# Patient Record
Sex: FEMALE | Race: WHITE | NOT HISPANIC OR LATINO
[De-identification: names, ages, dates, MRNs, and addresses within clinical notes are randomized per-mention and may not be internally consistent; named-entity substitution may affect disease eponyms.]

---

## 2022-06-19 ENCOUNTER — NON-APPOINTMENT (OUTPATIENT)
Age: 77
End: 2022-06-19

## 2022-11-29 PROBLEM — Z00.00 ENCOUNTER FOR PREVENTIVE HEALTH EXAMINATION: Status: ACTIVE | Noted: 2022-11-29

## 2022-12-10 ENCOUNTER — OFFICE (OUTPATIENT)
Dept: URBAN - METROPOLITAN AREA CLINIC 38 | Facility: CLINIC | Age: 77
Setting detail: OPHTHALMOLOGY
End: 2022-12-10
Payer: MEDICARE

## 2022-12-10 DIAGNOSIS — H01.001: ICD-10-CM

## 2022-12-10 DIAGNOSIS — H11.153: ICD-10-CM

## 2022-12-10 DIAGNOSIS — H01.005: ICD-10-CM

## 2022-12-10 DIAGNOSIS — H01.004: ICD-10-CM

## 2022-12-10 DIAGNOSIS — H16.223: ICD-10-CM

## 2022-12-10 DIAGNOSIS — H40.013: ICD-10-CM

## 2022-12-10 DIAGNOSIS — H02.834: ICD-10-CM

## 2022-12-10 DIAGNOSIS — H43.813: ICD-10-CM

## 2022-12-10 DIAGNOSIS — H02.831: ICD-10-CM

## 2022-12-10 DIAGNOSIS — H35.033: ICD-10-CM

## 2022-12-10 DIAGNOSIS — E11.9: ICD-10-CM

## 2022-12-10 DIAGNOSIS — H01.002: ICD-10-CM

## 2022-12-10 PROCEDURE — 92014 COMPRE OPH EXAM EST PT 1/>: CPT | Performed by: OPHTHALMOLOGY

## 2022-12-10 ASSESSMENT — REFRACTION_MANIFEST
OU_VA: 20/20
OS_SPHERE: PLANO
OS_AXIS: 000
OD_VA1: 20/20
OS_AXIS: 150
OD_SPHERE: PLANO
OU_VA: 20/20
OS_VA1: 20/20
OS_ADD: +2.25
OD_SPHERE: PLANO
OD_VA2: 20/20(J1+)
OS_ADD: +2.75
OD_VA1: 20/20
OD_ADD: +2.25
OD_ADD: +2.75
OS_VA2: 20/20(J1+)
OS_CYLINDER: SPH
OS_SPHERE: -0.25
OS_CYLINDER: +0.50
OD_CYLINDER: +0.25
OS_VA2: 20/20(J1+)
OD_VA2: 20/20(J1+)
OS_VA1: 20/20
OD_AXIS: 010

## 2022-12-10 ASSESSMENT — REFRACTION_CURRENTRX
OD_SPHERE: +2.50
OS_OVR_VA: 20/
OS_SPHERE: +2.50
OD_OVR_VA: 20/
OD_VPRISM_DIRECTION: SV
OS_VPRISM_DIRECTION: SV

## 2022-12-10 ASSESSMENT — AXIALLENGTH_DERIVED: OD_AL: 23.8706

## 2022-12-10 ASSESSMENT — KERATOMETRY
OD_AXISANGLE_DEGREES: 093
OS_K1POWER_DIOPTERS: 42.50
OD_K1POWER_DIOPTERS: 42.50
METHOD_AUTO_MANUAL: AUTO
OS_AXISANGLE_DEGREES: 086
OD_K2POWER_DIOPTERS: 43.00
OS_K2POWER_DIOPTERS: 43.00

## 2022-12-10 ASSESSMENT — VISUAL ACUITY
OS_BCVA: 20/20-
OD_BCVA: 20/20-2

## 2022-12-10 ASSESSMENT — LID POSITION - DERMATOCHALASIS
OD_DERMATOCHALASIS: RUL 1+
OS_DERMATOCHALASIS: LUL 1+

## 2022-12-10 ASSESSMENT — LID POSITION - COMMENTS
OS_COMMENTS: BROW PTOSIS
OD_COMMENTS: BROW PTOSIS

## 2022-12-10 ASSESSMENT — REFRACTION_AUTOREFRACTION
OD_CYLINDER: +0.50
OS_AXIS: 153
OD_AXIS: 022
OD_SPHERE: -0.25
OS_CYLINDER: +0.50
OS_SPHERE: PLANO

## 2022-12-10 ASSESSMENT — LID EXAM ASSESSMENTS
OS_COMMENTS: DEMODEX
OS_BLEPHARITIS: LLL LUL 2+
OD_COMMENTS: DEMODEX
OD_BLEPHARITIS: RLL RUL 2+

## 2022-12-10 ASSESSMENT — TONOMETRY
OS_IOP_MMHG: 16
OD_IOP_MMHG: 15

## 2022-12-10 ASSESSMENT — SPHEQUIV_DERIVED: OD_SPHEQUIV: 0

## 2022-12-10 ASSESSMENT — CONFRONTATIONAL VISUAL FIELD TEST (CVF)
OD_FINDINGS: FULL
OS_FINDINGS: FULL

## 2023-05-03 ENCOUNTER — APPOINTMENT (OUTPATIENT)
Dept: ENDOCRINOLOGY | Facility: CLINIC | Age: 78
End: 2023-05-03
Payer: MEDICARE

## 2023-05-03 VITALS
WEIGHT: 166 LBS | SYSTOLIC BLOOD PRESSURE: 116 MMHG | DIASTOLIC BLOOD PRESSURE: 68 MMHG | BODY MASS INDEX: 30.55 KG/M2 | HEIGHT: 62 IN | HEART RATE: 82 BPM | TEMPERATURE: 97.8 F | OXYGEN SATURATION: 97 % | RESPIRATION RATE: 14 BRPM

## 2023-05-03 PROCEDURE — 99214 OFFICE O/P EST MOD 30 MIN: CPT

## 2023-05-03 NOTE — PHYSICAL EXAM
[Alert] : alert [Normal Sclera/Conjunctiva] : normal sclera/conjunctiva [PERRL] : pupils equal, round and reactive to light [Normal Outer Ear/Nose] : the ears and nose were normal in appearance [Normal Hearing] : hearing was normal [No Respiratory Distress] : no respiratory distress [Clear to Auscultation] : lungs were clear to auscultation bilaterally [Normal S1, S2] : normal S1 and S2 [Normal Rate] : heart rate was normal [No Murmurs] : no murmurs [Regular Rhythm] : with a regular rhythm [Carotids Normal] : carotid pulses were normal with no bruits [No Edema] : no peripheral edema [No Varicosities] : there were no varicosital changes [Not Tender] : non-tender [Soft] : abdomen soft [No HSM] : no hepato-splenomegaly [Normal Supraclavicular Nodes] : no supraclavicular lymphadenopathy [Normal Anterior Cervical Nodes] : no anterior cervical lymphadenopathy [Normal Posterior Cervical Nodes] : no posterior cervical lymphadenopathy [No CVA Tenderness] : no ~M costovertebral angle tenderness [No Spinal Tenderness] : no spinal tenderness [Normal Gait] : normal gait [Kyphosis] : no kyphosis present [No Clubbing, Cyanosis] : no clubbing  or cyanosis of the fingernails [No Joint Swelling] : no joint swelling seen [Normal Strength/Tone] : muscle strength and tone were normal [No Rash] : no rash [Cranial Nerves Intact] : cranial nerves 2-12 were intact [Normal Reflexes] : deep tendon reflexes were 2+ and symmetric [Oriented x3] : oriented to person, place, and time [de-identified] : Slightly prominent thyroid gland with small palpable thyroid nodules. [FreeTextEntry1] : Deferred [de-identified] : Deferred [de-identified] : Deferred [de-identified] : Denies any polyuria polydipsia and dysuria

## 2023-05-03 NOTE — HISTORY OF PRESENT ILLNESS
[FreeTextEntry1] : 28-year-old white female with a past medical history of type 2 diabetes hypertension, obstructive sleep apnea, multinodular goiter had fine-needle aspiration biopsy performed on the left lower pole reported to be benign.  Patient claimed that she recently was admitted with dehydration and urinary tract infection while she was in [place] and possible sepsis.  She was then taken of the Farxiga and her sugar levels have been fluctuating between 80 to 89 mg per DL in the morning.  She also reports a weight loss of 40 pounds for the past 2 years.  She occasionally experiences episodes of dizziness but denies any loss of consciousness..  There is no history of any nausea vomiting abdominal pain diarrhea or dysuria.  Her vision has been stable.  She denies chest pain shortness of breath nausea vomiting fever or chills.  Her current medications include losartan, amlodipine, I will allopurinol, Ozempic 0.25 mg subcu every week and vitamin D3 supplements.

## 2023-05-03 NOTE — ASSESSMENT
[Diabetes Foot Care] : diabetes foot care [Long Term Vascular Complications] : long term vascular complications of diabetes [Carbohydrate Consistent Diet] : carbohydrate consistent diet [Importance of Diet and Exercise] : importance of diet and exercise to improve glycemic control, achieve weight loss and improve cardiovascular health [Exercise/Effect on Glucose] : exercise/effect on glucose [Hypoglycemia Management] : hypoglycemia management [Self Monitoring of Blood Glucose] : self monitoring of blood glucose [Retinopathy Screening] : Patient was referred to ophthalmology for retinopathy screening [FreeTextEntry1] : Middle-aged white female with a past medical history of for type 2 diabetes who recently had an episode of acute dehydration with a UTI and possible sepsis.  Most likely this was secondary to the use of Farxiga as the patient was not drinking enough oral liquids.  Currently she is stable and off the medication and is tolerating Ozempic 0.25 mg tablet daily her glucose levels are stable.  Patient also has a history of a multinodular goiter for which she has had multiple biopsies in the past which were reported to be negative.  Has most recent lab test from [place] show a normal complete metabolic panel cholesterol was 153 LDL cholesterol of 68 urine albumin/creatinine 2 was normal at 7 hemoglobin A1c level was 5.5% TSH is 1.68 lipase is normal at 33.  Patient is currently clinically stable with medical trolled type 2 diabetes , her dietary intake and is slowly starting to exercise.  The findings were discussed in detail with the patient and she was counseled about the results of the blood test.  Recommendation\par 1.  I have advised the patient to continue her current therapy with Ozempic.  Patient was made aware and nausea and vomiting.\par Patient will continue with a strict diabetic diet\par 3.  I am also requesting a follow-up sonogram of the thyroid to monitor the size of the thyroid nodules.\par 4.  The importance of adequate hydration was discussed with the patient and explained to her in detail.\par 5.  The above plan was discussed in detail with the patient and she will return to the office in approximately 3 to 4 months for follow-up with a repeat blood test.  Thank you

## 2023-06-30 ENCOUNTER — OFFICE (OUTPATIENT)
Dept: URBAN - METROPOLITAN AREA CLINIC 103 | Facility: CLINIC | Age: 78
Setting detail: OPHTHALMOLOGY
End: 2023-06-30
Payer: MEDICARE

## 2023-06-30 DIAGNOSIS — H01.001: ICD-10-CM

## 2023-06-30 DIAGNOSIS — H01.002: ICD-10-CM

## 2023-06-30 DIAGNOSIS — H35.373: ICD-10-CM

## 2023-06-30 DIAGNOSIS — H40.013: ICD-10-CM

## 2023-06-30 DIAGNOSIS — H02.834: ICD-10-CM

## 2023-06-30 DIAGNOSIS — H01.004: ICD-10-CM

## 2023-06-30 DIAGNOSIS — H02.831: ICD-10-CM

## 2023-06-30 DIAGNOSIS — H35.033: ICD-10-CM

## 2023-06-30 DIAGNOSIS — H43.813: ICD-10-CM

## 2023-06-30 DIAGNOSIS — E11.9: ICD-10-CM

## 2023-06-30 DIAGNOSIS — H01.005: ICD-10-CM

## 2023-06-30 DIAGNOSIS — H11.153: ICD-10-CM

## 2023-06-30 PROCEDURE — 92201 OPSCPY EXTND RTA DRAW UNI/BI: CPT | Performed by: OPHTHALMOLOGY

## 2023-06-30 PROCEDURE — 92014 COMPRE OPH EXAM EST PT 1/>: CPT | Performed by: OPHTHALMOLOGY

## 2023-06-30 PROCEDURE — 92134 CPTRZ OPH DX IMG PST SGM RTA: CPT | Performed by: OPHTHALMOLOGY

## 2023-06-30 ASSESSMENT — REFRACTION_MANIFEST
OU_VA: 20/20
OS_VA1: 20/20
OS_AXIS: 000
OD_VA2: 20/20(J1+)
OD_VA2: 20/20(J1+)
OD_ADD: +2.25
OS_CYLINDER: SPH
OD_CYLINDER: +0.25
OD_SPHERE: PLANO
OD_SPHERE: PLANO
OS_ADD: +2.25
OU_VA: 20/20
OD_VA1: 20/20
OS_SPHERE: PLANO
OS_SPHERE: -0.25
OS_VA1: 20/20
OD_VA1: 20/20
OS_CYLINDER: +0.50
OS_ADD: +2.75
OD_ADD: +2.75
OD_AXIS: 010
OS_VA2: 20/20(J1+)
OS_AXIS: 150
OS_VA2: 20/20(J1+)

## 2023-06-30 ASSESSMENT — LID POSITION - DERMATOCHALASIS
OS_DERMATOCHALASIS: LUL 2+
OD_DERMATOCHALASIS: RUL 2+

## 2023-06-30 ASSESSMENT — LID EXAM ASSESSMENTS
OD_COMMENTS: DEMODEX
OS_BLEPHARITIS: LLL LUL 2+
OS_COMMENTS: DEMODEX
OD_BLEPHARITIS: RLL RUL 2+

## 2023-06-30 ASSESSMENT — LID POSITION - COMMENTS
OD_COMMENTS: BROW PTOSIS
OS_COMMENTS: BROW PTOSIS

## 2023-06-30 ASSESSMENT — CONFRONTATIONAL VISUAL FIELD TEST (CVF)
OD_FINDINGS: FULL
OS_FINDINGS: FULL

## 2023-06-30 ASSESSMENT — REFRACTION_AUTOREFRACTION
OD_SPHERE: +0.25
OS_AXIS: 079
OD_AXIS: 095
OS_CYLINDER: -0.50
OD_CYLINDER: -0.50
OS_SPHERE: +0.25

## 2023-06-30 ASSESSMENT — VISUAL ACUITY
OS_BCVA: 20/20-1
OD_BCVA: 20/25-1

## 2023-06-30 ASSESSMENT — REFRACTION_CURRENTRX
OD_OVR_VA: 20/
OS_SPHERE: +2.50
OD_VPRISM_DIRECTION: SV
OS_OVR_VA: 20/
OS_VPRISM_DIRECTION: SV
OD_SPHERE: +2.50

## 2023-06-30 ASSESSMENT — SPHEQUIV_DERIVED
OD_SPHEQUIV: 0
OS_SPHEQUIV: 0

## 2023-06-30 ASSESSMENT — TONOMETRY
OS_IOP_MMHG: 14
OD_IOP_MMHG: 14

## 2023-09-22 ENCOUNTER — APPOINTMENT (OUTPATIENT)
Dept: ENDOCRINOLOGY | Facility: CLINIC | Age: 78
End: 2023-09-22
Payer: MEDICARE

## 2023-09-22 VITALS
TEMPERATURE: 97.1 F | WEIGHT: 168 LBS | RESPIRATION RATE: 14 BRPM | DIASTOLIC BLOOD PRESSURE: 74 MMHG | OXYGEN SATURATION: 97 % | HEIGHT: 62 IN | HEART RATE: 92 BPM | BODY MASS INDEX: 30.91 KG/M2 | SYSTOLIC BLOOD PRESSURE: 120 MMHG

## 2023-09-22 PROCEDURE — 99213 OFFICE O/P EST LOW 20 MIN: CPT

## 2023-09-22 RX ORDER — FOLIC ACID 20 MG
CAPSULE ORAL
Refills: 0 | Status: ACTIVE | COMMUNITY

## 2023-09-22 RX ORDER — PNV NO.95/FERROUS FUM/FOLIC AC 28MG-0.8MG
TABLET ORAL
Refills: 0 | Status: ACTIVE | COMMUNITY

## 2023-09-28 RX ORDER — BLOOD SUGAR DIAGNOSTIC
STRIP MISCELLANEOUS
Qty: 1 | Refills: 3 | Status: ACTIVE | COMMUNITY
Start: 2023-09-28 | End: 1900-01-01

## 2023-12-08 LAB — HBA1C MFR BLD HPLC: 5.3

## 2023-12-22 ENCOUNTER — OFFICE (OUTPATIENT)
Dept: URBAN - METROPOLITAN AREA CLINIC 9 | Facility: CLINIC | Age: 78
Setting detail: OPHTHALMOLOGY
End: 2023-12-22
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
## 2023-12-22 PROCEDURE — NO SHOW FE NO SHOW FEE: Performed by: OPHTHALMOLOGY

## 2023-12-27 ENCOUNTER — APPOINTMENT (OUTPATIENT)
Dept: INFECTIOUS DISEASE | Facility: CLINIC | Age: 78
End: 2023-12-27
Payer: MEDICARE

## 2023-12-27 VITALS
HEART RATE: 68 BPM | BODY MASS INDEX: 30.91 KG/M2 | SYSTOLIC BLOOD PRESSURE: 126 MMHG | DIASTOLIC BLOOD PRESSURE: 78 MMHG | HEIGHT: 62 IN | WEIGHT: 168 LBS | OXYGEN SATURATION: 99 %

## 2023-12-27 DIAGNOSIS — A04.72 ENTEROCOLITIS DUE TO CLOSTRIDIUM DIFFICILE, NOT SPECIFIED AS RECURRENT: ICD-10-CM

## 2023-12-27 DIAGNOSIS — D72.829 ELEVATED WHITE BLOOD CELL COUNT, UNSPECIFIED: ICD-10-CM

## 2023-12-27 PROCEDURE — 99214 OFFICE O/P EST MOD 30 MIN: CPT

## 2023-12-27 NOTE — PHYSICAL EXAM
[General Appearance - Alert] : alert [General Appearance - In No Acute Distress] : in no acute distress [General Appearance - Well Nourished] : well nourished [Extraocular Movements] : extraocular movements were intact [Hearing Threshold Finger Rub Not [place]] : hearing was normal [Neck Appearance] : the appearance of the neck was normal [Edema] : there was no peripheral edema [Abdomen Soft] : soft [Abdomen Tenderness] : non-tender [FreeTextEntry1] : no suprapubic tenderness [Musculoskeletal - Swelling] : no joint swelling [] : no rash [Affect] : the affect was normal

## 2023-12-27 NOTE — REVIEW OF SYSTEMS
[Fever] : no fever [Chills] : no chills [Feeling Tired] : feeling tired [Recent Weight Loss (___ Lbs)] : recent [unfilled] ~Ulb weight loss [Red Eyes] : eyes not red [Loss Of Hearing] : no hearing loss [Palpitations] : no palpitations [Shortness Of Breath] : no shortness of breath [Abdominal Pain] : no abdominal pain [Diarrhea] : no diarrhea [Dysuria] : no dysuria [Joint Pain] : no joint pain [Skin Lesions] : no skin lesions [Confused] : no confusion [Easy Bleeding] : no tendency for easy bleeding

## 2023-12-27 NOTE — ASSESSMENT
[FreeTextEntry1] : Seen today for follow up. Seen in hospital for C diff diarrhea with persistent leukocytosis and multiple episodes diarrhea daily despite 9 days of treatment with Po Vancomycin inpatient, first occurrence C diff. Decision was made to have her on a prolonged taper until 1/17/24. Today in office she says stool is now formed, small BMs about twice a day now. No fecal urgency or incontinence anymore. No abdominal pain now or in hospital. She has no fever, chills. She is going to [place] until March 2024, leaving beginning of January. Discussed with her we will re-check blood work and decide based on that if taper can be shortened. She expressed understanding.   She does still feel more fatigued than usual and lost approx 8 lbs since this illness given diarrhea. Now getting her appetite back and weight loss has stabilized.  Plan - ordered CBC and BMP---if leukocytosis still persistent or any MARINA will keep original plan of prolonged Vancomycin taper until 1/17/24--if no leukocytosis and renal function wnl she can DC Vancomycin q8h after this week and monitor closely for symptoms, I discussed with her for some patient diarrhea can recur within few days of stopping treatment, if that happens she should re-start the Vancomycin and give our office a call and see her doctor in [place]  -fatigue likely in setting of prolonged illness  - vulvar malignancy--treated at [facility]--management as per Onc  f/u PRN will call with lab results [Treatment Education] : treatment education [Treatment Adherence] : treatment adherence [Rx Dose / Side Effects] : Rx dose/side effects [Medical Care Issues] : medical care issues [Drug Interactions / Side Effects] : drug interactions/side effects [Disclosure of Diagnosis] : disclosure of diagnosis [Anticipatory Guidance] : anticipatory guidance

## 2023-12-27 NOTE — HISTORY OF PRESENT ILLNESS
[FreeTextEntry1] : Seen today for follow up. Seen in hospital for C diff diarrhea with persistent leukocytosis and multiple episodes diarrhea daily despite 9 days of treatment with Po Vancomycin inpatient, first occurrence C diff. Decision was made to have her on a prolonged taper until 1/17/24. Today in office she says stool is now formed, small BMs about twice a day now. No fecal urgency or incontinence anymore. No abdominal pain now or in hospital. She has no fever, chills. She is going to [place] until March 2024, leaving beginning of January. Discussed with her we will re-check blood work and decide based on that if taper can be shortened. She expressed understanding.

## 2024-01-03 LAB
ANION GAP SERPL CALC-SCNC: 15 MMOL/L
BASOPHILS # BLD AUTO: 0.03 K/UL
BASOPHILS NFR BLD AUTO: 0.6 %
BUN SERPL-MCNC: 13 MG/DL
CALCIUM SERPL-MCNC: 9.9 MG/DL
CHLORIDE SERPL-SCNC: 104 MMOL/L
CO2 SERPL-SCNC: 24 MMOL/L
CREAT SERPL-MCNC: 0.97 MG/DL
EGFR: 60 ML/MIN/1.73M2
EOSINOPHIL # BLD AUTO: 0.06 K/UL
EOSINOPHIL NFR BLD AUTO: 1.2 %
GLUCOSE SERPL-MCNC: 141 MG/DL
HCT VFR BLD CALC: 42.5 %
HGB BLD-MCNC: 13.5 G/DL
IMM GRANULOCYTES NFR BLD AUTO: 0.2 %
LYMPHOCYTES # BLD AUTO: 1.5 K/UL
LYMPHOCYTES NFR BLD AUTO: 30 %
MAN DIFF?: NORMAL
MCHC RBC-ENTMCNC: 29.9 PG
MCHC RBC-ENTMCNC: 31.8 GM/DL
MCV RBC AUTO: 94 FL
MONOCYTES # BLD AUTO: 0.47 K/UL
MONOCYTES NFR BLD AUTO: 9.4 %
NEUTROPHILS # BLD AUTO: 2.93 K/UL
NEUTROPHILS NFR BLD AUTO: 58.6 %
PLATELET # BLD AUTO: 331 K/UL
POTASSIUM SERPL-SCNC: 4.4 MMOL/L
RBC # BLD: 4.52 M/UL
RBC # FLD: 11.9 %
SODIUM SERPL-SCNC: 143 MMOL/L
WBC # FLD AUTO: 5 K/UL

## 2024-04-02 LAB — HBA1C MFR BLD HPLC: 5.3

## 2024-04-03 ENCOUNTER — APPOINTMENT (OUTPATIENT)
Dept: ENDOCRINOLOGY | Facility: CLINIC | Age: 79
End: 2024-04-03
Payer: MEDICARE

## 2024-04-03 VITALS
HEIGHT: 62 IN | WEIGHT: 160 LBS | RESPIRATION RATE: 14 BRPM | SYSTOLIC BLOOD PRESSURE: 108 MMHG | HEART RATE: 88 BPM | TEMPERATURE: 97.5 F | OXYGEN SATURATION: 99 % | BODY MASS INDEX: 29.44 KG/M2 | DIASTOLIC BLOOD PRESSURE: 64 MMHG

## 2024-04-03 PROCEDURE — G2211 COMPLEX E/M VISIT ADD ON: CPT

## 2024-04-03 PROCEDURE — 99214 OFFICE O/P EST MOD 30 MIN: CPT

## 2024-04-03 NOTE — HISTORY OF PRESENT ILLNESS
[FreeTextEntry1] : 79-year-old white female who has a past medical history of mild type 2 diabetes and a multinodular goiter who presents for routine follow-up.  Patient reports that she recently had an attack of her C. difficile the cause of this is not known and she had to be on antibiotic for significant.  Of time.  Patient just returned from [place] and she reports losing approximately 40 pounds.  Her energy level is very poor.  She she denies any abdominal pain nausea vomiting or any abdominal cramps she does have dizziness when she stands up and denies chest pains or shortness of breath.  Her fingersticks range in the morning between 112 to 88 mg per DL.  Eating only 2 meals a day and physically she is active as of nausea thing.  Her current medications include allopurinol, amlodipine 5 mg daily, omeprazole 20 mg daily losartan 100 mg daily Ozempic 0.25 mg subcutaneously weekly primary vaginal cream and vitamin B12..  384 chest pains, shortness of breath abdominal pain or diarrhea.  She

## 2024-04-03 NOTE — ASSESSMENT
[Diabetes Foot Care] : diabetes foot care [Carbohydrate Consistent Diet] : carbohydrate consistent diet [Long Term Vascular Complications] : long term vascular complications of diabetes [Importance of Diet and Exercise] : importance of diet and exercise to improve glycemic control, achieve weight loss and improve cardiovascular health [Exercise/Effect on Glucose] : exercise/effect on glucose [Self Monitoring of Blood Glucose] : self monitoring of blood glucose [Hypoglycemia Management] : hypoglycemia management [Insulin Self-Administration] : insulin self-administration [FreeTextEntry1] : Elderly white female who has a past medical history of a mild type 2 diabetes and a multinodular goiter who recently had an attack of her CT difficile which was treated for a prolonged.  Of time with antibiotics.  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  Clinical impression is that this patient has lost significant amount of weight and she has still been using the Ozempic.  Her appetite is fair and she does go out and takes her walks.  Recommendation 1.  I have suggested to the patient to obtain a new set of lab test so that we can assess the the control of the blood sugar and also a TSH level 2.  Patient will continue on all her current medications but if the symptoms become worse then we will have to lower her antihypertensive medication. 3.  I have advised the patient to return to the clinic in approximately 2 months to so that she can be checked out.  For any other underlying medical disease.  The plan was discussed in detail with the patient.  Thank you [Retinopathy Screening] : Patient was referred to ophthalmology for retinopathy screening

## 2024-04-03 NOTE — PHYSICAL EXAM
[Alert] : alert [Well Nourished] : well nourished [No Acute Distress] : no acute distress [Well Developed] : well developed [Normal Sclera/Conjunctiva] : normal sclera/conjunctiva [No Proptosis] : no proptosis [EOMI] : extra ocular movement intact [Normal Oropharynx] : the oropharynx was normal [Thyroid Not Enlarged] : the thyroid was not enlarged [No Thyroid Nodules] : no palpable thyroid nodules [No Respiratory Distress] : no respiratory distress [No Accessory Muscle Use] : no accessory muscle use [Clear to Auscultation] : lungs were clear to auscultation bilaterally [Normal Rate] : heart rate was normal [Normal S1, S2] : normal S1 and S2 [Carotids Normal] : carotid pulses were normal with no bruits [Regular Rhythm] : with a regular rhythm [Pedal Pulses Normal] : the pedal pulses are present [No Edema] : no peripheral edema [Normal Bowel Sounds] : normal bowel sounds [Not Tender] : non-tender [Not Distended] : not distended [Soft] : abdomen soft [No HSM] : no hepato-splenomegaly [Normal Supraclavicular Nodes] : no supraclavicular lymphadenopathy [Normal Posterior Cervical Nodes] : no posterior cervical lymphadenopathy [Normal Anterior Cervical Nodes] : no anterior cervical lymphadenopathy [No CVA Tenderness] : no ~M costovertebral angle tenderness [Kyphosis] : kyphosis present [Spine Straight] : spine straight [No Spinal Tenderness] : no spinal tenderness [No Stigmata of Cushings Syndrome] : no stigmata of Cushings Syndrome [Scoliosis] : scoliosis present [Normal Strength/Tone] : muscle strength and tone were normal [Normal Gait] : normal gait [No Rash] : no rash [Acanthosis Nigricans] : no acanthosis nigricans [Normal Reflexes] : deep tendon reflexes were 2+ and symmetric [No Motor Deficits] : the motor exam was normal [Oriented x3] : oriented to person, place, and time [No Tremors] : no tremors [de-identified] : Slightly prominent thyroid gland with a nodular texture especially over the lower boundaries

## 2024-04-03 NOTE — REVIEW OF SYSTEMS
[Fatigue] : fatigue [Recent Weight Gain (___ Lbs)] : recent weight gain: [unfilled] lbs [Fever] : no fever [Chills] : no chills [Dysphagia] : no dysphagia [Dysphonia] : no dysphonia [Palpitations] : no palpitations [Chest Pain] : no chest pain [Nausea] : no nausea [Abdominal Pain] : abdominal pain [Vomiting] : no vomiting [Diarrhea] : diarrhea [Gas/Bloating] : gas/bloating [Joint Pain] : joint pain [Muscle Weakness] : muscle weakness [Myalgia] : myalgia  [Headaches] : no headaches [Tremors] : no tremors [Dizziness] : dizziness [Polydipsia] : polydipsia [Cold Intolerance] : cold intolerance [Hot Flashes] : hot flashes [Negative] : Heme/Lymph

## 2024-04-05 LAB
ALBUMIN SERPL ELPH-MCNC: 3.7 G/DL
ALP BLD-CCNC: 84 U/L
ALT SERPL-CCNC: 8 U/L
ANION GAP SERPL CALC-SCNC: 15 MMOL/L
AST SERPL-CCNC: 12 U/L
BILIRUB SERPL-MCNC: 0.3 MG/DL
BUN SERPL-MCNC: 17 MG/DL
CALCIUM SERPL-MCNC: 9.7 MG/DL
CHLORIDE SERPL-SCNC: 100 MMOL/L
CHOLEST SERPL-MCNC: 124 MG/DL
CO2 SERPL-SCNC: 26 MMOL/L
CREAT SERPL-MCNC: 1.03 MG/DL
EGFR: 55 ML/MIN/1.73M2
ESTIMATED AVERAGE GLUCOSE: 120 MG/DL
GLUCOSE SERPL-MCNC: 109 MG/DL
HBA1C MFR BLD HPLC: 5.8 %
HDLC SERPL-MCNC: 47 MG/DL
LDLC SERPL CALC-MCNC: 60 MG/DL
NONHDLC SERPL-MCNC: 77 MG/DL
POTASSIUM SERPL-SCNC: 4.3 MMOL/L
PROT SERPL-MCNC: 6.6 G/DL
SODIUM SERPL-SCNC: 141 MMOL/L
TRIGL SERPL-MCNC: 92 MG/DL
TSH SERPL-ACNC: 1.56 UIU/ML

## 2024-04-24 ENCOUNTER — APPOINTMENT (OUTPATIENT)
Dept: ENDOCRINOLOGY | Facility: CLINIC | Age: 79
End: 2024-04-24
Payer: MEDICARE

## 2024-04-24 VITALS
SYSTOLIC BLOOD PRESSURE: 126 MMHG | WEIGHT: 160 LBS | HEART RATE: 83 BPM | TEMPERATURE: 96.4 F | RESPIRATION RATE: 14 BRPM | OXYGEN SATURATION: 98 % | HEIGHT: 62 IN | BODY MASS INDEX: 29.44 KG/M2 | DIASTOLIC BLOOD PRESSURE: 74 MMHG

## 2024-04-24 PROCEDURE — 99214 OFFICE O/P EST MOD 30 MIN: CPT

## 2024-04-24 PROCEDURE — G2211 COMPLEX E/M VISIT ADD ON: CPT

## 2024-04-24 RX ORDER — ALLOPURINOL 300 MG/1
300 TABLET ORAL
Refills: 0 | Status: ACTIVE | COMMUNITY

## 2024-04-24 RX ORDER — SEMAGLUTIDE 0.68 MG/ML
2 INJECTION, SOLUTION SUBCUTANEOUS
Qty: 2 | Refills: 3 | Status: DISCONTINUED | COMMUNITY
Start: 2023-11-21 | End: 2024-04-24

## 2024-04-24 RX ORDER — LOSARTAN POTASSIUM 100 MG/1
100 TABLET, FILM COATED ORAL
Refills: 0 | Status: ACTIVE | COMMUNITY

## 2024-04-24 RX ORDER — SEMAGLUTIDE 1.34 MG/ML
2 INJECTION, SOLUTION SUBCUTANEOUS
Refills: 0 | Status: DISCONTINUED | COMMUNITY

## 2024-04-24 RX ORDER — AMLODIPINE BESYLATE 5 MG/1
5 TABLET ORAL
Refills: 0 | Status: ACTIVE | COMMUNITY

## 2024-04-24 NOTE — ASSESSMENT
[Diabetes Foot Care] : diabetes foot care [Long Term Vascular Complications] : long term vascular complications of diabetes [Carbohydrate Consistent Diet] : carbohydrate consistent diet [Importance of Diet and Exercise] : importance of diet and exercise to improve glycemic control, achieve weight loss and improve cardiovascular health [Exercise/Effect on Glucose] : exercise/effect on glucose [Hypoglycemia Management] : hypoglycemia management [Self Monitoring of Blood Glucose] : self monitoring of blood glucose [Retinopathy Screening] : Patient was referred to ophthalmology for retinopathy screening [FreeTextEntry1] : Elderly white female with a past medical history of obesity and type 2 diabetes who was started on Ozempic a few months ago.  Patient has result of the medication has lost significant weight of approximately 40 pounds and now she is complaining of severe fatigue and tiredness and anorexia.  She has on occasions also feel dizzy and lightheaded.  And has noticed increased exhaustion with increased sleeping.  My impression is that the patient probably has become dehydrated secondary to the use of Ozempic and combined with the fact that she is not eating contributed to her severe fatigue and tiredness. recommendation 1.  I have advised to the patient that she should immediately discontinue the Ozempic and should increase her oral intake with liquids and electrolytes. 2.  Patient will also benefit from extra protein supplement drinks just Ensure Plus. 3.  Will also obtain a copy of the recent lab test which were performed at the oncologist office 4.  Patient will follow-up in approximately 4 to 6 weeks when we will reassess her medical condition and give further recommended patient's as needed.

## 2024-04-24 NOTE — HISTORY OF PRESENT ILLNESS
No changes to referral.  Standing INR order updated per protocol.  Jennifer Matias RN   [FreeTextEntry1] : Elderly white female with a past medical history of type 2 diabetes and is currently taking Ozempic 0.25 mg subcutaneously every week.  And is concerned about the fact that she has been losing significant amount of weight over the past few months.  She also is complaining of joint pains, loss of appetite and weight loss with dizziness over the past few days.  Patient has history of CP difficile colitis in the past.  Her fingersticks are ranging between 90 to 110 mg per DL and she is constantly exhausted and needs to take rest at times.  Patient has a past medical history of anemia for which has received iron transfusions.  Patient's current medications also include allopurinol, amlodipine, omeprazole, losartan, Ozempic, and vitamin supplements.  Review of systems is negative except for chronic fatigue and tiredness and significant weight loss over the past few weeks.

## 2024-04-24 NOTE — REVIEW OF SYSTEMS
[Fatigue] : fatigue [Decreased Appetite] : decreased appetite [Chills] : no chills [Nausea] : no nausea [Constipation] : no constipation [Abdominal Pain] : no abdominal pain [Vomiting] : no vomiting [Diarrhea] : no diarrhea [Gas/Bloating] : gas/bloating [Polyuria] : no polyuria [Nocturia] : no nocturia [Polydipsia] : no polydipsia [Cold Intolerance] : no cold intolerance [Negative] : Heme/Lymph [FreeTextEntry2] : Patient BMI 28.3 [de-identified] : Pallor of the skin

## 2024-05-14 DIAGNOSIS — R63.4 ABNORMAL WEIGHT LOSS: ICD-10-CM

## 2024-05-14 DIAGNOSIS — M25.50 PAIN IN UNSPECIFIED JOINT: ICD-10-CM

## 2024-05-14 DIAGNOSIS — R42 DIZZINESS AND GIDDINESS: ICD-10-CM

## 2024-05-21 ENCOUNTER — APPOINTMENT (OUTPATIENT)
Dept: ENDOCRINOLOGY | Facility: CLINIC | Age: 79
End: 2024-05-21

## 2024-05-21 VITALS
HEART RATE: 76 BPM | HEIGHT: 62 IN | OXYGEN SATURATION: 96 % | SYSTOLIC BLOOD PRESSURE: 124 MMHG | BODY MASS INDEX: 27.97 KG/M2 | DIASTOLIC BLOOD PRESSURE: 88 MMHG | TEMPERATURE: 96.7 F | WEIGHT: 152 LBS

## 2024-05-21 DIAGNOSIS — E04.2 NONTOXIC MULTINODULAR GOITER: ICD-10-CM

## 2024-05-21 DIAGNOSIS — E11.9 TYPE 2 DIABETES MELLITUS W/OUT COMPLICATIONS: ICD-10-CM

## 2024-05-21 PROCEDURE — 99213 OFFICE O/P EST LOW 20 MIN: CPT

## 2024-05-21 PROCEDURE — G2211 COMPLEX E/M VISIT ADD ON: CPT

## 2024-05-28 ENCOUNTER — OFFICE (OUTPATIENT)
Dept: URBAN - METROPOLITAN AREA CLINIC 97 | Facility: CLINIC | Age: 79
Setting detail: OPHTHALMOLOGY
End: 2024-05-28
Payer: MEDICARE

## 2024-05-28 DIAGNOSIS — H35.373: ICD-10-CM

## 2024-05-28 DIAGNOSIS — H01.001: ICD-10-CM

## 2024-05-28 DIAGNOSIS — H02.831: ICD-10-CM

## 2024-05-28 DIAGNOSIS — H01.005: ICD-10-CM

## 2024-05-28 DIAGNOSIS — H40.013: ICD-10-CM

## 2024-05-28 DIAGNOSIS — H01.004: ICD-10-CM

## 2024-05-28 DIAGNOSIS — E11.9: ICD-10-CM

## 2024-05-28 DIAGNOSIS — H01.002: ICD-10-CM

## 2024-05-28 DIAGNOSIS — H35.033: ICD-10-CM

## 2024-05-28 DIAGNOSIS — H35.3131: ICD-10-CM

## 2024-05-28 DIAGNOSIS — H11.153: ICD-10-CM

## 2024-05-28 DIAGNOSIS — H43.813: ICD-10-CM

## 2024-05-28 PROCEDURE — 92014 COMPRE OPH EXAM EST PT 1/>: CPT | Performed by: OPHTHALMOLOGY

## 2024-05-28 PROCEDURE — 92134 CPTRZ OPH DX IMG PST SGM RTA: CPT | Performed by: OPHTHALMOLOGY

## 2024-05-28 PROCEDURE — 76514 ECHO EXAM OF EYE THICKNESS: CPT | Performed by: OPHTHALMOLOGY

## 2024-05-28 ASSESSMENT — CONFRONTATIONAL VISUAL FIELD TEST (CVF)
OS_FINDINGS: FULL
OD_FINDINGS: FULL

## 2024-05-28 ASSESSMENT — LID EXAM ASSESSMENTS
OD_BLEPHARITIS: RLL RUL 2+
OS_COMMENTS: DEMODEX
OS_BLEPHARITIS: LLL LUL 2+
OD_COMMENTS: DEMODEX

## 2024-05-28 ASSESSMENT — LID POSITION - COMMENTS
OS_COMMENTS: BROW PTOSIS
OD_COMMENTS: BROW PTOSIS

## 2024-05-28 ASSESSMENT — LID POSITION - DERMATOCHALASIS
OD_DERMATOCHALASIS: RUL 2+
OS_DERMATOCHALASIS: LUL 2+

## 2024-07-19 ENCOUNTER — APPOINTMENT (OUTPATIENT)
Dept: ENDOCRINOLOGY | Facility: CLINIC | Age: 79
End: 2024-07-19

## 2024-09-03 ENCOUNTER — APPOINTMENT (OUTPATIENT)
Dept: ENDOCRINOLOGY | Facility: CLINIC | Age: 79
End: 2024-09-03
Payer: MEDICARE

## 2024-09-03 VITALS
WEIGHT: 158 LBS | OXYGEN SATURATION: 99 % | BODY MASS INDEX: 29.08 KG/M2 | HEART RATE: 60 BPM | TEMPERATURE: 95.3 F | SYSTOLIC BLOOD PRESSURE: 118 MMHG | DIASTOLIC BLOOD PRESSURE: 66 MMHG | HEIGHT: 62 IN

## 2024-09-03 DIAGNOSIS — E11.9 TYPE 2 DIABETES MELLITUS W/OUT COMPLICATIONS: ICD-10-CM

## 2024-09-03 DIAGNOSIS — E04.2 NONTOXIC MULTINODULAR GOITER: ICD-10-CM

## 2024-09-03 PROCEDURE — G2211 COMPLEX E/M VISIT ADD ON: CPT

## 2024-09-03 PROCEDURE — 99213 OFFICE O/P EST LOW 20 MIN: CPT

## 2024-09-03 NOTE — HISTORY OF PRESENT ILLNESS
[FreeTextEntry1] : 79-year-old white female with a past medical hx of DM2. She is currently taking allopurinol, amlodipine, Losartan. Patient states that she has a loss of an appetite and often has to remind herself to eat meals. She has a past Hx of Anemia for which has received iron infusions. Review of systems is negative no nausea or vomiting, no chest pain or SOB. Has frequent urination but denies any burning, vision has remained stable. No change in current medications.  Patient has been off the Ozempic but she has maintained the weight loss.  Her appetite is fair.  She denies any stomach pains .  Patient has been very careful about her dietary intake of starches her vision has been stable and she has no numbness or tingling of her lower extremities.

## 2024-09-03 NOTE — ASSESSMENT
[Diabetes Foot Care] : diabetes foot care [Long Term Vascular Complications] : long term vascular complications of diabetes [Carbohydrate Consistent Diet] : carbohydrate consistent diet [Importance of Diet and Exercise] : importance of diet and exercise to improve glycemic control, achieve weight loss and improve cardiovascular health [Exercise/Effect on Glucose] : exercise/effect on glucose [Hypoglycemia Management] : hypoglycemia management [Self Monitoring of Blood Glucose] : self monitoring of blood glucose [Retinopathy Screening] : Patient was referred to ophthalmology for retinopathy screening [FreeTextEntry1] : Elderly white female with a past medical history of for type 2 diabetes, multinodular goiter, and obesity who presents for routine follow-up.  Patient currently is of the Ozempic and she is being very compliant with her diet.  Her weight was down to 152 pounds but now she has really regained some weight and currently her weight is 158 pounds.  There is no evidence of any vascular complications.  She recently was on a trip for 10 days which she was able to tolerate without any difficulty.  Her vision is stable .  Patient recently had a blood test performed on July 31 which showed that the complete metabolic panel is normal her hemoglobin A1c was 5.8%.  Recommendation 1.  I have advised the patient to continue her current regimen of diet and exercise to control her glucose levels as she does not require any medical intervention to control her diabetes. 2.  I am also referring the patient for a follow-up sonogram of the thyroid to monitor her multinodular goiter 3.  Importance of diet exercise and weight control was discussed with the patient 4.  If the patient's condition remained stable she will then follow-up in approximately 3 months time with a repeat blood test.  The plan was discussed with the patient thank you

## 2024-09-03 NOTE — PHYSICAL EXAM
[Alert] : alert [Well Nourished] : well nourished [Healthy Appearance] : healthy appearance [No Acute Distress] : no acute distress [Well Developed] : well developed [Normal Sclera/Conjunctiva] : normal sclera/conjunctiva [EOMI] : extra ocular movement intact [No Proptosis] : no proptosis [Normal Oropharynx] : the oropharynx was normal [No Thyroid Nodules] : no palpable thyroid nodules [No Respiratory Distress] : no respiratory distress [No Accessory Muscle Use] : no accessory muscle use [Clear to Auscultation] : lungs were clear to auscultation bilaterally [Normal S1, S2] : normal S1 and S2 [Normal Rate] : heart rate was normal [Regular Rhythm] : with a regular rhythm [No Edema] : no peripheral edema [Pedal Pulses Normal] : the pedal pulses are present [Normal Bowel Sounds] : normal bowel sounds [Not Tender] : non-tender [Not Distended] : not distended [No Masses] : no abdominal mass palpated [Soft] : abdomen soft [Normal Anterior Cervical Nodes] : no anterior cervical lymphadenopathy [Normal Posterior Cervical Nodes] : no posterior cervical lymphadenopathy [No Spinal Tenderness] : no spinal tenderness [Spine Straight] : spine straight [No Stigmata of Cushings Syndrome] : no stigmata of Cushings Syndrome [Normal Gait] : normal gait [Normal Strength/Tone] : muscle strength and tone were normal [No Rash] : no rash [Abdominal Striae] : no abdominal striae [Acanthosis Nigricans] : no acanthosis nigricans [No Motor Deficits] : the motor exam was normal [No Sensory Deficits] : the sensory exam was normal to light touch and pinprick [Normal Reflexes] : deep tendon reflexes were 2+ and symmetric [No Tremors] : no tremors [Oriented x3] : oriented to person, place, and time [de-identified] : Bilaterally palpable thyroid slightly nodular in texture with a dominant nodule in the right middle pole area no associated lymphadenopathy

## 2024-12-03 ENCOUNTER — LABORATORY RESULT (OUTPATIENT)
Age: 79
End: 2024-12-03

## 2024-12-06 ENCOUNTER — APPOINTMENT (OUTPATIENT)
Dept: ENDOCRINOLOGY | Facility: CLINIC | Age: 79
End: 2024-12-06
Payer: MEDICARE

## 2024-12-06 VITALS
WEIGHT: 152 LBS | SYSTOLIC BLOOD PRESSURE: 118 MMHG | DIASTOLIC BLOOD PRESSURE: 72 MMHG | HEART RATE: 89 BPM | HEIGHT: 62 IN | BODY MASS INDEX: 27.97 KG/M2 | TEMPERATURE: 97.2 F | OXYGEN SATURATION: 98 %

## 2024-12-06 DIAGNOSIS — E04.2 NONTOXIC MULTINODULAR GOITER: ICD-10-CM

## 2024-12-06 DIAGNOSIS — E11.9 TYPE 2 DIABETES MELLITUS W/OUT COMPLICATIONS: ICD-10-CM

## 2024-12-06 PROCEDURE — 99213 OFFICE O/P EST LOW 20 MIN: CPT

## 2025-02-21 PROBLEM — H35.033 HYPERTENSIVE RETINOPATHY; BOTH EYES: Status: ACTIVE | Noted: 2025-02-21

## 2025-06-11 ENCOUNTER — LABORATORY RESULT (OUTPATIENT)
Age: 80
End: 2025-06-11

## 2025-06-11 ENCOUNTER — APPOINTMENT (OUTPATIENT)
Dept: ENDOCRINOLOGY | Facility: CLINIC | Age: 80
End: 2025-06-11
Payer: MEDICARE

## 2025-06-11 VITALS
WEIGHT: 160 LBS | OXYGEN SATURATION: 99 % | TEMPERATURE: 97.1 F | SYSTOLIC BLOOD PRESSURE: 122 MMHG | BODY MASS INDEX: 29.44 KG/M2 | DIASTOLIC BLOOD PRESSURE: 70 MMHG | HEART RATE: 81 BPM | HEIGHT: 62 IN

## 2025-06-11 PROCEDURE — 99213 OFFICE O/P EST LOW 20 MIN: CPT

## 2025-07-17 ENCOUNTER — OFFICE (OUTPATIENT)
Dept: URBAN - METROPOLITAN AREA CLINIC 97 | Facility: CLINIC | Age: 80
Setting detail: OPHTHALMOLOGY
End: 2025-07-17
Payer: MEDICARE

## 2025-07-17 DIAGNOSIS — H35.373: ICD-10-CM

## 2025-07-17 DIAGNOSIS — H40.013: ICD-10-CM

## 2025-07-17 DIAGNOSIS — H11.153: ICD-10-CM

## 2025-07-17 DIAGNOSIS — H35.033: ICD-10-CM

## 2025-07-17 PROBLEM — H02.831 DERMATOCHALASIS; RIGHT UPPER LID, LEFT UPPER LID: Status: ACTIVE | Noted: 2025-07-17

## 2025-07-17 PROBLEM — H02.834 DERMATOCHALASIS; RIGHT UPPER LID, LEFT UPPER LID: Status: ACTIVE | Noted: 2025-07-17

## 2025-07-17 PROCEDURE — 92134 CPTRZ OPH DX IMG PST SGM RTA: CPT

## 2025-07-17 PROCEDURE — 92014 COMPRE OPH EXAM EST PT 1/>: CPT

## 2025-07-17 ASSESSMENT — LID EXAM ASSESSMENTS
OD_COMMENTS: DEMODEX
OS_COMMENTS: DEMODEX
OD_BLEPHARITIS: RLL RUL 2+
OS_BLEPHARITIS: LLL LUL 2+

## 2025-07-17 ASSESSMENT — KERATOMETRY
METHOD_AUTO_MANUAL: AUTO
OS_K2POWER_DIOPTERS: 41.50
OD_AXISANGLE_DEGREES: 090
OD_K2POWER_DIOPTERS: 42.50
OS_AXISANGLE_DEGREES: 090
OD_K1POWER_DIOPTERS: 42.50
OS_K1POWER_DIOPTERS: 41.50

## 2025-07-17 ASSESSMENT — REFRACTION_CURRENTRX
OS_SPHERE: +2.50
OD_SPHERE: +2.50
OD_VPRISM_DIRECTION: SV
OS_OVR_VA: 20/
OD_OVR_VA: 20/
OS_VPRISM_DIRECTION: SV

## 2025-07-17 ASSESSMENT — LID POSITION - DERMATOCHALASIS
OD_DERMATOCHALASIS: RUL 2+
OS_DERMATOCHALASIS: LUL 2+

## 2025-07-17 ASSESSMENT — REFRACTION_MANIFEST
OD_VA1: 20/20
OS_VA2: 20/20(J1+)
OS_SPHERE: -0.50
OD_AXIS: 010
OD_CYLINDER: +0.25
OU_VA: 20/20
OD_CYLINDER: +0.25
OS_AXIS: 000
OD_SPHERE: -0.50
OS_SPHERE: -0.25
OU_VA: 20/20
OD_VA2: 20/20(J1+)
OD_ADD: +2.75
OD_AXIS: 015
OS_CYLINDER: +0.25
OS_VA1: 20/20
OS_ADD: +2.25
OS_VA1: 20/20
OS_VA2: 20/20(J1+)
OD_VA1: 20/20
OS_ADD: +2.75
OS_AXIS: 170
OD_VA2: 20/20(J1+)
OD_ADD: +2.25
OD_SPHERE: PLANO
OS_CYLINDER: SPH

## 2025-07-17 ASSESSMENT — REFRACTION_AUTOREFRACTION
OS_CYLINDER: +0.75
OD_SPHERE: -0.25
OS_SPHERE: -0.25
OS_AXIS: 168
OD_CYLINDER: +0.75
OD_AXIS: 014

## 2025-07-17 ASSESSMENT — CONFRONTATIONAL VISUAL FIELD TEST (CVF)
OS_FINDINGS: FULL
OD_FINDINGS: FULL

## 2025-07-17 ASSESSMENT — VISUAL ACUITY
OD_BCVA: 20/20-2
OS_BCVA: 20/20-

## 2025-07-17 ASSESSMENT — LID POSITION - COMMENTS
OS_COMMENTS: BROW PTOSIS
OD_COMMENTS: BROW PTOSIS